# Patient Record
Sex: MALE | Race: WHITE | NOT HISPANIC OR LATINO | URBAN - METROPOLITAN AREA
[De-identification: names, ages, dates, MRNs, and addresses within clinical notes are randomized per-mention and may not be internally consistent; named-entity substitution may affect disease eponyms.]

---

## 2017-05-02 ENCOUNTER — EMERGENCY (EMERGENCY)
Facility: HOSPITAL | Age: 32
LOS: 1 days | Discharge: HOME | End: 2017-05-02
Attending: EMERGENCY MEDICINE | Admitting: EMERGENCY MEDICINE
Payer: SELF-PAY

## 2017-05-02 VITALS
SYSTOLIC BLOOD PRESSURE: 119 MMHG | OXYGEN SATURATION: 97 % | WEIGHT: 184.97 LBS | DIASTOLIC BLOOD PRESSURE: 62 MMHG | HEART RATE: 106 BPM | TEMPERATURE: 98 F | RESPIRATION RATE: 18 BRPM

## 2017-05-02 VITALS
OXYGEN SATURATION: 97 % | SYSTOLIC BLOOD PRESSURE: 116 MMHG | TEMPERATURE: 98 F | RESPIRATION RATE: 18 BRPM | HEART RATE: 104 BPM | DIASTOLIC BLOOD PRESSURE: 68 MMHG

## 2017-05-02 DIAGNOSIS — T23.241A BURN OF SECOND DEGREE OF MULTIPLE RIGHT FINGERS (NAIL), INCLUDING THUMB, INITIAL ENCOUNTER: ICD-10-CM
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
## 2017-05-02 DIAGNOSIS — Z72.0 TOBACCO USE: ICD-10-CM
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
## 2017-05-02 PROCEDURE — 99283 EMERGENCY DEPT VISIT LOW MDM: CPT | Mod: 25

## 2017-05-02 PROCEDURE — 16020 DRESS/DEBRID P-THICK BURN S: CPT
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
## 2017-05-02 NOTE — ED PROVIDER NOTE - MUSCULOSKELETAL, MLM
Spine appears normal, range of motion is not limited, no muscle or joint tenderness. Right hand  1-5 finger at finger  pads + blisters noted. blister isolated at finger pads only not circumferential, no motor or sensory deficit,
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
## 2021-07-13 NOTE — ED ADULT NURSE NOTE - NS ED NOTE ABUSE RESPONSE YN
Care Management Discharge Note    Discharge Date: 07/13/2021  Expected Time of Departure:  ([facility] transport stretcher at 1500hrs.)    Discharge Disposition: Skilled Nursing Facilty ([facility] at [phone].)    Discharge Services:      Discharge DME: None    Discharge Transportation: agency    PAS Confirmation Code:  ([id number])  Patient/family educated on Medicare website which has current facility and service quality ratings: yes (patient and friend Moi)    Education Provided on the Discharge Plan:    Persons Notified of Discharge Plans: Pt, friend, and bedside RN.  Patient/Family in Agreement with the Plan: yes (patient and friend Moi)      Additional Information:  After further discussion with [facility], they have approved a TCU stay authorization.    Pt will be transferring to [facility] at ~1500hrs per [facility] stretcher transport for safety d/t cognitive decline and physical mobility.    All parties involved are in agreement with the transfer.    PAS and PCS completed by CM.    No further CM needs identified.    Lv Puente, RN         no

## 2022-10-12 ENCOUNTER — EMERGENCY (EMERGENCY)
Facility: HOSPITAL | Age: 37
LOS: 1 days | Discharge: HOME | End: 2022-10-12
Admitting: EMERGENCY MEDICINE
Payer: SELF-PAY

## 2022-10-12 VITALS
HEART RATE: 82 BPM | TEMPERATURE: 98 F | DIASTOLIC BLOOD PRESSURE: 78 MMHG | SYSTOLIC BLOOD PRESSURE: 122 MMHG | RESPIRATION RATE: 18 BRPM | OXYGEN SATURATION: 99 %

## 2022-10-12 VITALS
WEIGHT: 179.9 LBS | RESPIRATION RATE: 18 BRPM | SYSTOLIC BLOOD PRESSURE: 118 MMHG | OXYGEN SATURATION: 97 % | TEMPERATURE: 98 F | DIASTOLIC BLOOD PRESSURE: 78 MMHG | HEART RATE: 80 BPM | HEIGHT: 68 IN

## 2022-10-12 DIAGNOSIS — H18.892 OTHER SPECIFIED DISORDERS OF CORNEA, LEFT EYE: ICD-10-CM
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
## 2022-10-12 DIAGNOSIS — T15.92XA FOREIGN BODY ON EXTERNAL EYE, PART UNSPECIFIED, LEFT EYE, INITIAL ENCOUNTER: ICD-10-CM
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
## 2022-10-12 DIAGNOSIS — H57.89 OTHER SPECIFIED DISORDERS OF EYE AND ADNEXA: ICD-10-CM
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
## 2022-10-12 PROCEDURE — 99284 EMERGENCY DEPT VISIT MOD MDM: CPT

## 2022-10-12 PROCEDURE — 65222 REMOVE FOREIGN BODY FROM EYE: CPT | Mod: LT

## 2022-10-12 RX ORDER — ERYTHROMYCIN BASE 5 MG/GRAM
1 OINTMENT (GRAM) OPHTHALMIC (EYE)
Qty: 1 | Refills: 0
Start: 2022-10-12 | End: 2022-10-18

## 2022-10-12 RX ORDER — OFLOXACIN 0.3 %
1 DROPS OPHTHALMIC (EYE)
Qty: 10 | Refills: 0
Start: 2022-10-12 | End: 2022-10-18

## 2022-10-12 RX ORDER — POLYMYXIN B SULF/TRIMETHOPRIM 10000-1/ML
1 DROPS OPHTHALMIC (EYE) ONCE
Refills: 0 | Status: COMPLETED | OUTPATIENT
Start: 2022-10-12 | End: 2022-10-12

## 2022-10-12 RX ADMIN — Medication 1 DROP(S): at 20:37

## 2022-10-12 NOTE — ED PROVIDER NOTE - PATIENT PORTAL LINK FT
You can access the FollowMyHealth Patient Portal offered by [facility] by registering at the following website: [email or web address]. By joining [facility]’s FollowMyHealth portal, you will also be able to view your health information using other applications (apps) compatible with our system.

## 2022-10-12 NOTE — ED ADULT NURSE NOTE - OBJECTIVE STATEMENT
Pt presented to ED with c/o of L eye pain/ blurriness since yesterday due to cutting metal w/ a  at work and possibility it have got a piece of it into L eye. AOX4. VSS.  Patient denies any acute symptoms. Patient oriented to ED area. All needs attended. Rounding in progress. Fall risk precautions maintained.

## 2022-10-12 NOTE — ED ADULT TRIAGE NOTE - CHIEF COMPLAINT QUOTE
pt c/o L eye pain/ blurriness since yesterday. pt states he was cutting metal w/ a  at work and may have got a piece of it into L eye. pt states was wearing goggles at the time. used eye drops at home w no relief

## 2022-10-12 NOTE — ED PROVIDER NOTE - EYES, MLM
left eye +sclera injection, PERRLA, EOMI, visual acuity 20/20, fluorescein stain with +fb on cornea at 10 oclock

## 2022-10-12 NOTE — ED PROVIDER NOTE - OBJECTIVE STATEMENT
38 y/o m presents c/o left eye redness, tearing, discomfort since yesterday afternoon.  Pt was drilling metal, was wearing eye protection at the time but thinks something got in his eye.  Pt reports no vision changes, mostly tearing and discomfort.  Denies contact lenses.

## 2022-10-12 NOTE — ED PROVIDER NOTE - PROGRESS NOTE DETAILS
Received s/o from [facility] pending ophthalmology eval for retained metal FB. Pt seen and examined by ophthalmology. FB removed. Rust ring present. Pt to f/u w/ ophthalmology in 24- 48 hours for further tx of rust ring. Recommends: Ofloxacin gttps QID followed by erythryomycin ointment QID x 1 week

## 2022-10-12 NOTE — CONSULT NOTE ADULT - SUBJECTIVE AND OBJECTIVE BOX
HPI: 37M presenting with left eye metallic foreign body. Reports that 1 day ago, patient was drilling metal, was wearing safety goggles when small piece of metal went into his left eye. Denies changes in vision. No flashes or floaters. Reports discomfort and some tearing.    Ophthalmic Exam (limited by bedside evaluation)     Vision (near):     OD 20/20    OS 20/20    Intraocular Pressure (measured by palpation):     OD wnl    OS wnl    Confrontational Visual Fields:     OD full to finger     OS full to finger     Pupils:     OD round, reactive, no APD     OS round, reactive, no APD     Motility:     OD grossly full     OS grossly full     External/Lids/Orbit:     OD normal    OS normal    Conjunctiva:     OD white and quiet     OS slight injection    Cornea:     OD clear     OS small approximately 1x1mm circular metallic foreign body embedded within cornea at peripheral 10oc    Anterior Chamber:     OD deep and quiet     OS deep and quiet, no notable cell    Iris:     OD normal     OS normal     Lens:     OD clear     OS clear     Deferred Dilated Fundus Exam

## 2022-10-12 NOTE — ED PROVIDER NOTE - CARE PLAN
Principal Discharge DX:	Foreign body in eye   1 Principal Discharge DX:	Foreign body in eye  Secondary Diagnosis:	Corneal rust ring, left

## 2022-10-12 NOTE — ED PROVIDER NOTE - NSFOLLOWUPCLINICS_GEN_ALL_ED_FT
[facility]  Ophthalmology  [address] [place] [address]  Phone: [phone]  Fax:

## 2022-10-12 NOTE — ED PROVIDER NOTE - CLINICAL SUMMARY MEDICAL DECISION MAKING FREE TEXT BOX
38 y/o m presents with left eye fb; visual acuity intact, pt refusing tetanus shot (educated regarding possibility of tetanus infection causing respiratory failure)

## 2022-10-12 NOTE — ED ADULT NURSE NOTE - NSFALLRSKUNASSIST_ED_ALL_ED
TSH normal. She was contributing due to recent antibiotic use for tooth extraction. We'll reevaluate   no

## 2022-10-12 NOTE — ED PROVIDER NOTE - NSFOLLOWUPINSTRUCTIONS_ED_ALL_ED_FT
You were evaluated in the ED for left eye pain and metal foreign body. You were evaluated in the ED by ophthalmology and the foreign body was removed. There is a residual rust ring. Sometimes the rust ring needs further debridement. For this reason, IT IS IMPORTANT YOU FOLLOW UP WITH OPHTHALMOLOGY WITHIN 24-48 HOURS FOR FURTHER EVALUATION AND TREATMENT.     TAKE THE FOLLOWING ANTIBIOTICS AS PRESCRIBED:  -Ofloxacin eye drops 4 times a day in the eye  -Erythromycin ointment, to be placed in the left eye AFTER the ofloxacin eye drop, 4 times a day    [facility]  [address] [place] [address]   [phone]    or [facility] (see follow up section)      Eye Foreign Body      An eye foreign body is an object on the surface of the eye or in the eyeball that should not be there. The foreign body may be a small speck of dirt or dust, a hair or eyelash, a splinter, a tiny piece of metal, or any other object.    A foreign body can injure the eye. It may be found on the outside of the eyeball (extraocular) or inside the eyeball (intraocular). An intraocular foreign body is a medical emergency because it can result in vision loss or loss of the eye.      What are the causes?    This condition is caused by an object accidentally hitting or entering the eye. A common cause is when a tiny piece of metal is thrown into the air while a person is working with certain tools.      What are the signs or symptoms?    Symptoms of this condition depend on what the foreign body is, and where it is in the eye. In some cases, a small foreign body may cause few symptoms at first. Foreign bodies are commonly found:•On the inner surface of the eyelids or on the covering of the white part of the eye (conjunctiva). A foreign body here may cause:  •Pain and irritation, especially when blinking.      •Feeling like something is in the eye.      •Tearing.      •Redness.      •On the surface of the clear covering on the front of the eye (cornea). A foreign body here may cause:  •Pain and irritation.      •Small "rust rings" around a metal (metallic) foreign body.      •Feeling like something is in the eye.      •Blurry vision.      •Tearing.      •Redness.      •Inside the eyeball. A foreign body here may cause:  •A lot of pain.      •Immediate loss of vision or blurry vision.      •A change in the shape of the black part of the eye (distortion of the pupil).          How is this diagnosed?    Foreign bodies are found during an exam by an eye care specialist.  •Foreign bodies on the eyelids, conjunctiva, or cornea are usually (but not always) easily found.    •When a foreign body is inside the eyeball, cloudiness in the lens of the eye (cataract) may form almost right away. This makes it hard for an eye specialist to find the foreign body. You may need tests, such as:  •Ultrasound.      •X-rays.      •CT scan.          How is this treated?    Foreign bodies on the eyelids, conjunctiva, or cornea are often removed easily and painlessly. Your health care provider may do this by washing the eye or using small instruments to take the foreign body out. Treatment may also include:  •Using numbing (anesthetic) eye drops to relieve pain.      •Removal of rust in the cornea using a small, drill-like instrument.      •Antibiotic drops or ointment if the foreign body caused a scratch or scrape (abrasion) of the cornea.      •Wearing a bandage (eye shield) over your eye.      If you have a foreign body inside your eyeball, you will need surgery right away.    You may need to be referred to an eye specialist (ophthalmologist) for further treatment.      Follow these instructions at home:     Medicines     •Take over-the-counter and prescription medicines only as told by your health care provider. Use eye drops or ointment as directed.      •If you were prescribed antibiotic drops or ointment, use it as told by your health care provider. Do not stop using the antibiotic even if your condition improves.      General instructions   •If you have an eye shield:  •Wear it as directed. Follow instructions from your health care provider about when to remove the shield.      •Do not drive or use machinery while wearing the shield.      •If you do not have an eye shield:  •Keep your eye closed as much as possible.      •Do not rub your eye.      •Do not wear contact lenses until your eye feels normal again, or as instructed by your health care provider.      •Wear dark sunglasses as needed to protect your eyes from bright light.      •If you are doing activities with a high risk of eye injury, such as working with high-speed tools, wear protective eye covering.        •Keep all follow-up visits. This is important.        Contact a health care provider if:    •You have more pain in your eye.      •You have problems with your eye shield.      •You have abnormal fluid (discharge) coming from your eye.        Get help right away if:    •Your vision gets worse.      •You have more redness and swelling in or around your eye.        Summary    •An eye foreign body is an object on the surface of the eye or in the eyeball that should not be there.      •A foreign body can injure the eye. It may be found on the outside of the eyeball (extraocular) or inside the eyeball (intraocular). An intraocular foreign body is a medical emergency.      •This condition is caused by objects that accidentally contact or enter the eye. Examples of these objects include dirt, dust, glass, metal, or an eyelash.      •Treatment may involve removal of the foreign body by washing the eye, using certain instruments, or surgery. You may need to use antibiotics or wear a bandage (eye shield) over your eye.      This information is not intended to replace advice given to you by your health care provider. Make sure you discuss any questions you have with your health care provider.

## 2022-10-12 NOTE — CONSULT NOTE ADULT - ASSESSMENT
Metallic Foreign Body, Left Eye  - presented with small approx. 1x1mm circular metallic foreign body to left peripheral cornea x 1 day  - vision stable  - no inflammation  - at slit-lamp, removed superficial metallic object with 27 gauge needle  - rust ring still present   PLAN:  - start ofloxacin QID to left eye  - start erythromycin ophthalmic ointment QID to left eye (drops before ointment)  - will need follow-up in 1-2 days to remove rust ring with nidia tool  - dependent on insurance status, instructed pt to call for appt at [facility] or [facility]

## 2022-10-14 ENCOUNTER — APPOINTMENT (OUTPATIENT)
Dept: OPHTHALMOLOGY | Facility: CLINIC | Age: 37
End: 2022-10-14

## 2022-10-14 ENCOUNTER — NON-APPOINTMENT (OUTPATIENT)
Age: 37
End: 2022-10-14

## 2022-10-14 PROCEDURE — 65210 REMOVE FOREIGN BODY FROM EYE: CPT | Mod: LT

## 2022-10-14 PROCEDURE — 92002 INTRM OPH EXAM NEW PATIENT: CPT | Mod: 25

## 2022-10-21 ENCOUNTER — APPOINTMENT (OUTPATIENT)
Dept: OPHTHALMOLOGY | Facility: CLINIC | Age: 37
End: 2022-10-21

## 2023-03-07 ENCOUNTER — APPOINTMENT (OUTPATIENT)
Dept: OPHTHALMOLOGY | Facility: CLINIC | Age: 38
End: 2023-03-07
Payer: COMMERCIAL

## 2023-03-07 ENCOUNTER — APPOINTMENT (OUTPATIENT)
Dept: OPHTHALMOLOGY | Facility: CLINIC | Age: 38
End: 2023-03-07

## 2023-03-07 ENCOUNTER — NON-APPOINTMENT (OUTPATIENT)
Age: 38
End: 2023-03-07

## 2023-03-07 PROCEDURE — 92012 INTRM OPH EXAM EST PATIENT: CPT

## 2023-03-10 ENCOUNTER — APPOINTMENT (OUTPATIENT)
Dept: OPHTHALMOLOGY | Facility: CLINIC | Age: 38
End: 2023-03-10

## 2023-06-28 PROBLEM — Z00.00 ENCOUNTER FOR PREVENTIVE HEALTH EXAMINATION: Status: ACTIVE | Noted: 2023-06-28

## 2023-08-23 ENCOUNTER — APPOINTMENT (OUTPATIENT)
Dept: OPHTHALMOLOGY | Facility: CLINIC | Age: 38
End: 2023-08-23
Payer: COMMERCIAL

## 2023-08-23 ENCOUNTER — NON-APPOINTMENT (OUTPATIENT)
Age: 38
End: 2023-08-23

## 2023-08-23 PROCEDURE — 92002 INTRM OPH EXAM NEW PATIENT: CPT

## 2023-09-06 ENCOUNTER — NON-APPOINTMENT (OUTPATIENT)
Age: 38
End: 2023-09-06

## 2023-09-06 ENCOUNTER — APPOINTMENT (OUTPATIENT)
Dept: OPHTHALMOLOGY | Facility: CLINIC | Age: 38
End: 2023-09-06
Payer: COMMERCIAL

## 2023-09-06 PROCEDURE — 92012 INTRM OPH EXAM EST PATIENT: CPT

## 2024-02-07 NOTE — ED ADULT TRIAGE NOTE - NSTRIAGECARE_GEN_A_ER
Past Medical History:   Diagnosis Date    Anticoagulant long-term use     COVID-19 virus infection     Diabetes mellitus     Diabetes mellitus, type 2     Hypertension     May-Thurner syndrome      Past Surgical History:   Procedure Laterality Date    APPENDECTOMY      ARTHROSCOPY OF KNEE Left 1/16/2024    Procedure: ARTHROSCOPY, KNEE, LEFT;  Surgeon: Mandeep Gatica MD;  Location: [address];  Service: Orthopedics;  Laterality: Left;    ARTHROSCOPY OF KNEE Left 1/21/2024    Procedure: ARTHROSCOPY, KNEE, left, supine, slider, lateral post, conmed,;  Surgeon: Derick Sloan MD;  Location: [facility] OR [address] [place] [address];  Service: Orthopedics;  Laterality: Left;    ARTHROSCOPY OF KNEE Left 2/1/2024    Procedure: ARTHROSCOPY, KNEE, left, lateral post;  Surgeon: Mandeep Gatica MD;  Location: [address];  Service: Orthopedics;  Laterality: Left;    ARTHROTOMY OF KNEE Left 12/29/2023    Procedure: ARTHROTOMY, KNEE;  Surgeon: Edy Bocanegra Jr., MD;  Location: [facility];  Service: Orthopedics;  Laterality: Left;    ESOPHAGOGASTRODUODENOSCOPY N/A 1/31/2022    Procedure: EGD (ESOPHAGOGASTRODUODENOSCOPY);  Surgeon: Cindy Quiros MD;  Location: [facility];  Service: Endoscopy;  Laterality: N/A;    ESOPHAGOGASTRODUODENOSCOPY N/A 3/21/2022    Procedure: EGD (ESOPHAGOGASTRODUODENOSCOPY);  Surgeon: Tejas Mann MD;  Location: [facility];  Service: Endoscopy;  Laterality: N/A;    INCISION AND DRAINAGE FOOT Left 11/28/2021    Procedure: INCISION AND DRAINAGE, FOOT;  Surgeon: Bj lAicea DPM;  Location: [facility];  Service: Podiatry;  Laterality: Left;    INCISION AND DRAINAGE OF THIGH Right 1/21/2024    Procedure: INCISION AND DRAINAGE, THIGH - right,;  Surgeon: Derick Sloan MD;  Location: [address];  Service: Orthopedics;  Laterality: Right;  right, lateral, slider, cysto tubing, 6L NS, betadine, peroxide, vanc powder, 10 [place] channel winter drain    IRRIGATION AND DEBRIDEMENT OF LOWER  EXTREMITY Right 1/21/2024    Procedure: I&D lateral thigh abscess, right, supine, slider, cysto tubing, 6L NS, betadine, peroxide, vanc powder, 10 [place]ian channel winter drain,;  Surgeon: Derick Sloan MD;  [facility];  Service: Orthopedics;  Laterality: Right;    IRRIGATION AND DEBRIDEMENT OF LOWER EXTREMITY Right 2/1/2024    Procedure: IRRIGATION AND DEBRIDEMENT, LOWER EXTREMITY;  Surgeon: Mandeep Gatica MD;  [facility];  Service: Orthopedics;  Laterality: Right;  started at 1510 pm incision    KNEE ARTHROSCOPY W/ DEBRIDEMENT Left 1/21/2024    Procedure: ARTHROSCOPY, KNEE, WITH DEBRIDEMENT - LEFT, SUPINE, SLIDER, LATERAL POST, CONMED;  Surgeon: Derick Sloan MD;  [facility];  Service: Orthopedics;  Laterality: Left;    stents in bilateral legs      TRANSESOPHAGEAL ECHOCARDIOGRAPHY N/A 1/3/2024    Procedure: ECHOCARDIOGRAM, TRANSESOPHAGEAL;  Surgeon: Douglas Doss MD;  Location: [facility] CATH LAB/EP;  Service: Cardiology;  Laterality: N/A;     Review of patient's allergies indicates:   Allergen Reactions    Heparin analogues Nausea And Vomiting       Scheduled Medications:    ampicillin-sulbactam  3 g Intravenous Q6H    DAPTOmycin (CUBICIN) 945 mg in sodium chloride 0.9% SolP 50 mL IVPB  10 mg/kg (Adjusted) Intravenous Daily    insulin aspart U-100  7 Units Subcutaneous TIDWM    insulin detemir U-100  13 Units Subcutaneous QHS    metoprolol succinate  12.5 mg Oral Daily    rivaroxaban  10 mg Oral Daily with dinner    vitamin D  1,000 Units Oral Daily       PRN Medications: acetaminophen, dextrose 10%, dextrose 10%, glucagon (human recombinant), glucose, glucose, HYDROcodone-acetaminophen, insulin aspart U-100, methocarbamoL, naloxone, oxyCODONE, prochlorperazine, sodium chloride 0.9%, sodium chloride 0.9%    Family History       Problem Relation (Age of Onset)    Cancer Mother, Paternal Uncle, Paternal Grandfather, Maternal Grandfather     Irritable bowel syndrome Paternal Grandfather          Tobacco Use    Smoking status: Former     Types: Cigarettes    Smokeless tobacco: Former    Tobacco comments:     occasionally    Substance and Sexual Activity    Alcohol use: Yes     Comment: occ    Drug use: No    Sexual activity: Not on file     Review of Systems   Constitutional:  Positive for activity change. Negative for fatigue and fever.   HENT:  Negative for trouble swallowing and voice change.    Respiratory:  Negative for cough and shortness of breath.    Cardiovascular:  Negative for chest pain and leg swelling.   Gastrointestinal:  Negative for abdominal distention and abdominal pain.   Genitourinary:  Negative for difficulty urinating and flank pain.   Musculoskeletal:  Positive for gait problem. Negative for back pain.   Skin:  Negative for color change.   Neurological:  Positive for weakness. Negative for speech difficulty and numbness.   Psychiatric/Behavioral:  Negative for agitation and confusion.      Objective:     Vital Signs (Most Recent):  Temp: 98.5 °F (36.9 °C) (02/07/24 0719)  Pulse: 85 (02/07/24 0719)  Resp: 17 (02/07/24 0719)  BP: (!) 141/89 (02/07/24 0719)  SpO2: 99 % (02/07/24 0719)    Vital Signs (24h Range):  Temp:  [97.8 °F (36.6 °C)-98.6 °F (37 °C)] 98.5 °F (36.9 °C)  Pulse:  [80-92] 85  Resp:  [17-18] 17  SpO2:  [95 %-100 %] 99 %  BP: (127-164)/(68-95) 141/89     Body mass index is 31.03 kg/m².     Physical Exam  Vitals and nursing note reviewed.   Constitutional:       Appearance: He is well-developed.   HENT:      Head: Normocephalic and atraumatic.   Eyes:      General:         Right eye: No discharge.         Left eye: No discharge.      Pupils: Pupils are equal, round, and reactive to light.   Pulmonary:      Effort: Pulmonary effort is normal. No respiratory distress.   Abdominal:      General: There is no distension.      Palpations: Abdomen is soft.      Tenderness: There is no abdominal tenderness.   Musculoskeletal:          General: No deformity.      Cervical back: Neck supple.      Comments: RLE with drain in place to thigh  LLE with dressing/ace badndage in place    Skin:     General: Skin is warm and dry.   Neurological:      Mental Status: He is oriented to person, place, and time. Mental status is at baseline.      Sensory: No sensory deficit.      Motor: Weakness present. No abnormal muscle tone.      Gait: Gait abnormal.   Psychiatric:         Mood and Affect: Mood normal.         Behavior: Behavior normal.         Thought Content: Thought content normal.          NEUROLOGICAL EXAMINATION:     MENTAL STATUS   Oriented to person, place, and time.     CRANIAL NERVES     CN III, IV, VI   Pupils are equal, round, and reactive to light.      Diagnostic Results: Labs: Reviewed  ECG: Reviewed  CT: Reviewed   Ice

## 2024-03-01 ENCOUNTER — EMERGENCY (EMERGENCY)
Facility: HOSPITAL | Age: 39
LOS: 1 days | Discharge: HOME | End: 2024-03-01
Admitting: EMERGENCY MEDICINE
Payer: COMMERCIAL

## 2024-03-01 VITALS
WEIGHT: 164.91 LBS | OXYGEN SATURATION: 99 % | SYSTOLIC BLOOD PRESSURE: 120 MMHG | TEMPERATURE: 98 F | HEART RATE: 62 BPM | DIASTOLIC BLOOD PRESSURE: 83 MMHG | RESPIRATION RATE: 16 BRPM

## 2024-03-01 DIAGNOSIS — M25.511 PAIN IN RIGHT SHOULDER: ICD-10-CM

## 2024-03-01 LAB
ANION GAP SERPL CALC-SCNC: 11 MMOL/L — SIGNIFICANT CHANGE UP (ref 5–17)
BASOPHILS # BLD AUTO: 0.02 K/UL — SIGNIFICANT CHANGE UP (ref 0–0.2)
BASOPHILS NFR BLD AUTO: 0.3 % — SIGNIFICANT CHANGE UP (ref 0–2)
BUN SERPL-MCNC: 20 MG/DL — SIGNIFICANT CHANGE UP (ref 7–23)
CALCIUM SERPL-MCNC: 9.8 MG/DL — SIGNIFICANT CHANGE UP (ref 8.4–10.5)
CHLORIDE SERPL-SCNC: 104 MMOL/L — SIGNIFICANT CHANGE UP (ref 96–108)
CK MB CFR SERPL CALC: 2.5 NG/ML — SIGNIFICANT CHANGE UP (ref 0–6.7)
CK SERPL-CCNC: 185 U/L — SIGNIFICANT CHANGE UP (ref 30–200)
CO2 SERPL-SCNC: 26 MMOL/L — SIGNIFICANT CHANGE UP (ref 22–31)
CREAT SERPL-MCNC: 0.97 MG/DL — SIGNIFICANT CHANGE UP (ref 0.5–1.3)
EGFR: 102 ML/MIN/1.73M2 — SIGNIFICANT CHANGE UP
EOSINOPHIL # BLD AUTO: 0.12 K/UL — SIGNIFICANT CHANGE UP (ref 0–0.5)
EOSINOPHIL NFR BLD AUTO: 1.9 % — SIGNIFICANT CHANGE UP (ref 0–6)
GLUCOSE SERPL-MCNC: 100 MG/DL — HIGH (ref 70–99)
HCT VFR BLD CALC: 40.5 % — SIGNIFICANT CHANGE UP (ref 39–50)
HGB BLD-MCNC: 14.3 G/DL — SIGNIFICANT CHANGE UP (ref 13–17)
IMM GRANULOCYTES NFR BLD AUTO: 0.3 % — SIGNIFICANT CHANGE UP (ref 0–0.9)
LYMPHOCYTES # BLD AUTO: 1.69 K/UL — SIGNIFICANT CHANGE UP (ref 1–3.3)
LYMPHOCYTES # BLD AUTO: 27.3 % — SIGNIFICANT CHANGE UP (ref 13–44)
MAGNESIUM SERPL-MCNC: 1.9 MG/DL — SIGNIFICANT CHANGE UP (ref 1.6–2.6)
MCHC RBC-ENTMCNC: 31.2 PG — SIGNIFICANT CHANGE UP (ref 27–34)
MCHC RBC-ENTMCNC: 35.3 GM/DL — SIGNIFICANT CHANGE UP (ref 32–36)
MCV RBC AUTO: 88.2 FL — SIGNIFICANT CHANGE UP (ref 80–100)
MONOCYTES # BLD AUTO: 0.31 K/UL — SIGNIFICANT CHANGE UP (ref 0–0.9)
MONOCYTES NFR BLD AUTO: 5 % — SIGNIFICANT CHANGE UP (ref 2–14)
NEUTROPHILS # BLD AUTO: 4.04 K/UL — SIGNIFICANT CHANGE UP (ref 1.8–7.4)
NEUTROPHILS NFR BLD AUTO: 65.2 % — SIGNIFICANT CHANGE UP (ref 43–77)
NRBC # BLD: 0 /100 WBCS — SIGNIFICANT CHANGE UP (ref 0–0)
PLATELET # BLD AUTO: 191 K/UL — SIGNIFICANT CHANGE UP (ref 150–400)
POTASSIUM SERPL-MCNC: 4.5 MMOL/L — SIGNIFICANT CHANGE UP (ref 3.5–5.3)
POTASSIUM SERPL-SCNC: 4.5 MMOL/L — SIGNIFICANT CHANGE UP (ref 3.5–5.3)
RBC # BLD: 4.59 M/UL — SIGNIFICANT CHANGE UP (ref 4.2–5.8)
RBC # FLD: 11.9 % — SIGNIFICANT CHANGE UP (ref 10.3–14.5)
SODIUM SERPL-SCNC: 141 MMOL/L — SIGNIFICANT CHANGE UP (ref 135–145)
TROPONIN T, HIGH SENSITIVITY RESULT: <6 NG/L — SIGNIFICANT CHANGE UP (ref 0–51)
WBC # BLD: 6.2 K/UL — SIGNIFICANT CHANGE UP (ref 3.8–10.5)
WBC # FLD AUTO: 6.2 K/UL — SIGNIFICANT CHANGE UP (ref 3.8–10.5)

## 2024-03-01 PROCEDURE — 71046 X-RAY EXAM CHEST 2 VIEWS: CPT

## 2024-03-01 PROCEDURE — 36415 COLL VENOUS BLD VENIPUNCTURE: CPT

## 2024-03-01 PROCEDURE — 93005 ELECTROCARDIOGRAM TRACING: CPT

## 2024-03-01 PROCEDURE — 84484 ASSAY OF TROPONIN QUANT: CPT

## 2024-03-01 PROCEDURE — 85025 COMPLETE CBC W/AUTO DIFF WBC: CPT

## 2024-03-01 PROCEDURE — 99285 EMERGENCY DEPT VISIT HI MDM: CPT | Mod: 25

## 2024-03-01 PROCEDURE — 83735 ASSAY OF MAGNESIUM: CPT

## 2024-03-01 PROCEDURE — 80048 BASIC METABOLIC PNL TOTAL CA: CPT

## 2024-03-01 PROCEDURE — 82550 ASSAY OF CK (CPK): CPT

## 2024-03-01 PROCEDURE — 73030 X-RAY EXAM OF SHOULDER: CPT

## 2024-03-01 PROCEDURE — 82553 CREATINE MB FRACTION: CPT

## 2024-03-01 PROCEDURE — 71046 X-RAY EXAM CHEST 2 VIEWS: CPT | Mod: 26

## 2024-03-01 PROCEDURE — 73030 X-RAY EXAM OF SHOULDER: CPT | Mod: 26,RT

## 2024-03-01 PROCEDURE — 99285 EMERGENCY DEPT VISIT HI MDM: CPT

## 2024-03-01 RX ORDER — IBUPROFEN 200 MG
600 TABLET ORAL ONCE
Refills: 0 | Status: COMPLETED | OUTPATIENT
Start: 2024-03-01 | End: 2024-03-01

## 2024-03-01 RX ADMIN — Medication 600 MILLIGRAM(S): at 16:27

## 2024-03-01 NOTE — ED ADULT NURSE NOTE - OBJECTIVE STATEMENT
37 y/o M c/o R shoulder pain for approx 2 months, endorses pain radiates down R arm and pulsating at times. Pt denies trauma, falls, numbness, tingling, neck pain, dizziness, lightheadedness, blurry vision, chest pain, SOB. PT A&Ox4, respirations even and unlabored, skin color WDL warm and dry, pt is ambulatory with a steady gait. No acute distress observed.

## 2024-03-01 NOTE — ED PROVIDER NOTE - CARE PROVIDER_API CALL
Victor Hugo Vu  Orthopaedic Surgery  [address], [address]  [place] [address]  Phone: [phone]  Fax: [phone]  Follow Up Time:     Hector Valentin  Orthopaedic Surgery  [address]  [place] [address]  Phone: [phone]  Fax: [phone]  Follow Up Time:     Truong Lebron  Internal Medicine  [address] [place] [address]  Phone: [phone]  Fax: [phone]  Follow Up Time:

## 2024-03-01 NOTE — ED PROVIDER NOTE - PROVIDER TOKENS
PROVIDER:[TOKEN:[[id number]]],PROVIDER:[TOKEN:[[id number]]],PROVIDER:[TOKEN:[[id number]]]

## 2024-03-01 NOTE — ED PROVIDER NOTE - CLINICAL SUMMARY MEDICAL DECISION MAKING FREE TEXT BOX
37 yo m with no pmh, RHD c/o R shoulder pain x 2 months. Pain goes down his arm and feel a pulsating in his shoulder and different spots of his arm. Denies trauma but works as  and sometimes lifts heavy things. Denies numbness, tingling, ha, neck pain, weakness, cp, sob. Pt has never taken anything for pain. States it has been getting worse specifically at night. R shoulder- non tender, FROM at shoulder and elbow, no erythema, warmth, swelling, sensation intact, strength 5/5. XR neg. advised NSAIDs, ortho f/u, will all refer to pcp 37 yo m with no pmh, RHD c/o R shoulder pain x 2 months. Pain goes down his arm and feel a pulsating in his shoulder and different spots of his arm. Also sometimes radiates into the chest. Denies trauma but works as  and sometimes lifts heavy things. Denies numbness, tingling, ha, neck pain, weakness, sob. Pt has never taken anything for pain. States it has been getting worse specifically at night. R shoulder- non tender, FROM at shoulder and elbow, no erythema, warmth, swelling, sensation intact, strength 5/5. XR neg. advised NSAIDs, ortho f/u, will all refer to pcp

## 2024-03-01 NOTE — ED PROVIDER NOTE - PATIENT PORTAL LINK FT
You can access the FollowMyHealth Patient Portal offered by [facility] by registering at the following website: [email or web address]. By joining [employer]’s FollowMyHealth portal, you will also be able to view your health information using other applications (apps) compatible with our system.

## 2024-03-01 NOTE — ED ADULT NURSE NOTE - CAS EDN DISCHARGE INTERVENTIONS
Called and left message. I will call again    Dermatology recommends mod strength steroid for 2 weeks with moisturizing.       
Called and reviewed    Orders Placed This Encounter     triamcinolone (KENALOG) 0.1 % ointment     Sig: Apply sparingly to affected area 2 times daily for 14 days. Continue with moisturizers.     Dispense:  80 g     Refill:  3     Follow up in 3-4 weeks for recheck  
IV discontinued, cath removed intact

## 2024-03-01 NOTE — ED PROVIDER NOTE - PHYSICAL EXAMINATION
CONSTITUTIONAL: Well-appearing;  in no apparent distress.   HEAD: Normocephalic; atraumatic.   EYES: PERRL; EOM intact; conjunctiva and sclera clear  ENT: normal nose; no rhinorrhea; normal pharynx with no erythema or lesions.   NECK: Supple; non-tender;   CARDIOVASCULAR: rrr,  RESPIRATORY: Breathing easily;   MSK: R shoulder- non tender, FROM at shoulder and elbow, no erythema, warmth, swelling, sensation intact, strength 5/5   EXT: No cyanosis or edema; N/V intact  SKIN: Normal for age and race; warm; dry; good turgor; no apparent lesions or rash.

## 2024-03-01 NOTE — ED PROVIDER NOTE - OBJECTIVE STATEMENT
37 yo m with no pmh, RHD c/o R shoulder pain x 2 months. Pain goes down his arm and feel a pulsating in his shoulder and different spots of his arm. Denies trauma but works as  and sometimes lifts heavy things. Denies numbness, tingling, ha, neck pain, weakness, cp, sob. Pt has never taken anything for pain. States it has been getting worse specifically at night. States if he lies on the R shoulder it is more painful. Does not have a pcp 39 yo m with no pmh, RHD c/o R shoulder pain x 2 months. Pain goes down his arm and feel a pulsating in his shoulder and different spots of his arm. States sometimes radiates into the chest.  Denies trauma but works as  and sometimes lifts heavy things. Denies numbness, tingling, ha, neck pain, weakness, sob. Pt has never taken anything for pain. States it has been getting worse specifically at night. States if he lies on the R shoulder it is more painful. Does not have a pcp

## 2024-03-01 NOTE — ED PROVIDER NOTE - NSFOLLOWUPINSTRUCTIONS_ED_ALL_ED_FT
Follow up with the orthopedic doctor and a primary care doctor  Take ibuprofen 600mg every 6 hours with food as needed for pain      Shoulder Pain  Many things can cause shoulder pain, including:  An injury to the shoulder.  Overuse of the shoulder.  Arthritis.  The source of the pain can be:  Inflammation.  An injury to the shoulder joint.  An injury to a tendon, ligament, or bone.  Follow these instructions at home:  Pay attention to changes in your symptoms. Let your health care provider know about them. Follow these instructions to relieve your pain.    If you have a removable sling:    Wear the sling as told by your provider. Remove it only as told by your provider.  Check the skin around the sling every day. Tell your provider about any concerns.  Loosen the sling if your fingers tingle, become numb, or become cold.  Keep the sling clean.  If the sling is not waterproof:  Do not let it get wet.  Remove it to shower or bathe.  Move your arm as little as possible, but keep your hand moving to prevent swelling.  Managing pain, stiffness, and swelling    Bag of ice on a towel on the skin.  If told, put ice on the painful area.  If you have a removable sling or immobilizer, remove it as told by your provider.  Put ice in a plastic bag.  Place a towel between your skin and the bag.  Leave the ice on for 20 minutes, 2–3 times a day.  If your skin turns bright red, remove the ice right away to prevent skin damage. The risk of damage is higher if you cannot feel pain, heat, or cold.  Move your fingers often to reduce stiffness and swelling.  Squeeze a soft ball or a foam pad as much as possible. This helps to keep the shoulder from swelling. It also helps to strengthen the arm.  General instructions    Take over-the-counter and prescription medicines only as told by your provider.  Exercise may help with pain management. Perform exercises if told by your provider.  You may be referred to a physical therapist to help in your recovery process.  Keep all follow-up visits in order to avoid any type of permanent shoulder disability or chronic pain problems.  Contact a health care provider if:  Your pain is not relieved with medicines.  New pain develops in your arm, hand, or fingers.  You loosen your sling and your arm, hand, or fingers remain tingly, numb, swollen, or painful.  Get help right away if:  Your arm, hand, or fingers turn white or blue.  This information is not intended to replace advice given to you by your health care provider. Make sure you discuss any questions you have with your health care provider. your xrays were normal   your labs were normal  Follow up with the orthopedic doctor and a primary care doctor  Take ibuprofen 600mg every 6 hours with food as needed for pain      Shoulder Pain  Many things can cause shoulder pain, including:  An injury to the shoulder.  Overuse of the shoulder.  Arthritis.  The source of the pain can be:  Inflammation.  An injury to the shoulder joint.  An injury to a tendon, ligament, or bone.  Follow these instructions at home:  Pay attention to changes in your symptoms. Let your health care provider know about them. Follow these instructions to relieve your pain.    If you have a removable sling:    Wear the sling as told by your provider. Remove it only as told by your provider.  Check the skin around the sling every day. Tell your provider about any concerns.  Loosen the sling if your fingers tingle, become numb, or become cold.  Keep the sling clean.  If the sling is not waterproof:  Do not let it get wet.  Remove it to shower or bathe.  Move your arm as little as possible, but keep your hand moving to prevent swelling.  Managing pain, stiffness, and swelling    Bag of ice on a towel on the skin.  If told, put ice on the painful area.  If you have a removable sling or immobilizer, remove it as told by your provider.  Put ice in a plastic bag.  Place a towel between your skin and the bag.  Leave the ice on for 20 minutes, 2–3 times a day.  If your skin turns bright red, remove the ice right away to prevent skin damage. The risk of damage is higher if you cannot feel pain, heat, or cold.  Move your fingers often to reduce stiffness and swelling.  Squeeze a soft ball or a foam pad as much as possible. This helps to keep the shoulder from swelling. It also helps to strengthen the arm.  General instructions    Take over-the-counter and prescription medicines only as told by your provider.  Exercise may help with pain management. Perform exercises if told by your provider.  You may be referred to a physical therapist to help in your recovery process.  Keep all follow-up visits in order to avoid any type of permanent shoulder disability or chronic pain problems.  Contact a health care provider if:  Your pain is not relieved with medicines.  New pain develops in your arm, hand, or fingers.  You loosen your sling and your arm, hand, or fingers remain tingly, numb, swollen, or painful.  Get help right away if:  Your arm, hand, or fingers turn white or blue.  This information is not intended to replace advice given to you by your health care provider. Make sure you discuss any questions you have with your health care provider.

## 2024-03-01 NOTE — ED PROVIDER NOTE - CARE PROVIDERS DIRECT ADDRESSES
,DirectAddress_Unknown,[email or web address]

## 2024-03-27 ENCOUNTER — NON-APPOINTMENT (OUTPATIENT)
Age: 39
End: 2024-03-27

## 2024-03-27 ENCOUNTER — APPOINTMENT (OUTPATIENT)
Dept: INTERNAL MEDICINE | Facility: CLINIC | Age: 39
End: 2024-03-27
Payer: COMMERCIAL

## 2024-03-27 ENCOUNTER — LABORATORY RESULT (OUTPATIENT)
Age: 39
End: 2024-03-27

## 2024-03-27 VITALS
WEIGHT: 190 LBS | SYSTOLIC BLOOD PRESSURE: 122 MMHG | HEART RATE: 90 BPM | BODY MASS INDEX: 28.14 KG/M2 | DIASTOLIC BLOOD PRESSURE: 85 MMHG | HEIGHT: 69 IN | TEMPERATURE: 98 F | OXYGEN SATURATION: 97 %

## 2024-03-27 DIAGNOSIS — R00.2 PALPITATIONS: ICD-10-CM

## 2024-03-27 DIAGNOSIS — M25.511 PAIN IN RIGHT SHOULDER: ICD-10-CM

## 2024-03-27 PROCEDURE — 36415 COLL VENOUS BLD VENIPUNCTURE: CPT

## 2024-03-27 PROCEDURE — 99385 PREV VISIT NEW AGE 18-39: CPT

## 2024-03-27 PROCEDURE — 93000 ELECTROCARDIOGRAM COMPLETE: CPT

## 2024-03-27 PROCEDURE — 99203 OFFICE O/P NEW LOW 30 MIN: CPT

## 2024-03-27 RX ORDER — LIDOCAINE 5% 700 MG/1
5 PATCH TOPICAL
Qty: 30 | Refills: 0 | Status: ACTIVE | COMMUNITY
Start: 2024-03-27 | End: 1900-01-01

## 2024-03-27 RX ORDER — NAPROXEN 500 MG/1
500 TABLET ORAL
Qty: 14 | Refills: 0 | Status: ACTIVE | COMMUNITY
Start: 2024-03-27 | End: 1900-01-01

## 2024-03-28 NOTE — ASSESSMENT
[FreeTextEntry1] : 38M w/no PMH presenting after ER visit on 3/1 to establish care, acute shoulder pain and palpitations.  Acute shoulder pain - Xray reviewed on HIE, Rx naproxen 500mg BID, although pt very hesitant to take oral medications. Discussed if no improvement, can trial gabapentin 100mg TID, possible nerve impingment?  Referred to ortho.  Palpitations - EKG sinus, labs including TSH today

## 2024-03-28 NOTE — HISTORY OF PRESENT ILLNESS
[FreeTextEntry1] : post ER visit, shoulder pain  [de-identified] : 38M w/no PMH presenting after ER visit on 3/1 for acute shoulder pain. Pt reports pain has been ongoing for weeks, with pins and needles sensation in his upper arm. Worse at night, needs to sleep on the other shoulder.  Does not like taking medications, so did not try anything for it.  In ER on 3/1, had R shoulder xray and CXR which was wnl, pt referred to PCP and ortho.

## 2024-03-28 NOTE — HEALTH RISK ASSESSMENT
[0] : 2) Feeling down, depressed, or hopeless: Not at all (0) [PHQ-2 Negative - No further assessment needed] : PHQ-2 Negative - No further assessment needed [Never] : Never [[id number]] : 0

## 2024-03-28 NOTE — PHYSICAL EXAM
[Well-Appearing] : well-appearing [EOMI] : extraocular movements intact [Supple] : supple [No Respiratory Distress] : no respiratory distress  [Normal Rate] : normal rate  [Regular Rhythm] : with a regular rhythm [Normal S1, S2] : normal S1 and S2 [Soft] : abdomen soft [Non Tender] : non-tender [No CVA Tenderness] : no CVA  tenderness [No Joint Swelling] : no joint swelling [Grossly Normal Strength/Tone] : grossly normal strength/tone [No Focal Deficits] : no focal deficits [Normal Gait] : normal gait [Normal Affect] : the affect was normal [Normal Insight/Judgement] : insight and judgment were intact [de-identified] : No obv abnormalitiy or tenderness of R shoulder joint, PROM and AROM intact, no limitations

## 2024-04-03 ENCOUNTER — APPOINTMENT (OUTPATIENT)
Dept: ORTHOPEDIC SURGERY | Facility: CLINIC | Age: 39
End: 2024-04-03
Payer: COMMERCIAL

## 2024-04-03 VITALS
OXYGEN SATURATION: 98 % | SYSTOLIC BLOOD PRESSURE: 122 MMHG | WEIGHT: 190 LBS | BODY MASS INDEX: 28.14 KG/M2 | DIASTOLIC BLOOD PRESSURE: 71 MMHG | HEIGHT: 69 IN | HEART RATE: 78 BPM

## 2024-04-03 PROCEDURE — 99203 OFFICE O/P NEW LOW 30 MIN: CPT

## 2024-04-03 RX ORDER — CYCLOBENZAPRINE HYDROCHLORIDE 10 MG/1
10 TABLET, FILM COATED ORAL
Qty: 30 | Refills: 0 | Status: ACTIVE | COMMUNITY
Start: 2024-04-03 | End: 1900-01-01

## 2024-04-03 RX ORDER — MELOXICAM 15 MG/1
15 TABLET ORAL DAILY
Qty: 30 | Refills: 1 | Status: ACTIVE | COMMUNITY
Start: 2024-04-03 | End: 1900-01-01

## 2024-04-05 NOTE — HISTORY OF PRESENT ILLNESS
[de-identified] : CIPRIANO ESTEVEZ is a 38 year old male who presents with right shoulder pain. Patient is right-hand dominant. Patient went to the ER on 3/1/2024 for this pain. No antecedent trauma or injury. Has not experienced previously. There is no associated stiffness, numbness, paraesthesia or weakness. No associated neck pain. Exacerbating factors are lifting, dressing, lifting arms over head, grasping Has tried Naproxen 500 mg but dislikes taking medication. Exercises regularly. Has not tried PT or OT. No prior injections or surgery. Employment:

## 2024-04-05 NOTE — PHYSICAL EXAM
[de-identified] : MSK: C-spine demonstrates normal lordosis, no tenderness to palpation midline, paraspinals, full painless AROM Cervical facet loading negative Spurling's Compression negative   Examination of right shoulder shows no overlying skin changes or muscular atrophy. There is no tenderness to palpation over the AC joint, Bicipital groove, Trapezius  ROM shows forward elevation 180 degrees abduction, 180 degrees external rotation, 60 degrees internal rotation to mid thoracic spine. There is no pain at the end range of motion.   Special Tests: Neer's negative Hawkin's negative Amos's negative Resisted ext rotation negative Lift off negative [place] negative Speed's negative Apprehension test negative No scapular winging.   Examination of left shoulder shows no overlying skin changes or muscular atrophy. There is no tenderness to palpation over the AC joint, Bicipital groove, Trapezius ROM shows forward elevation 180 degrees abduction, 180 degrees external rotation, 60 degrees internal rotation to mid thoracic spine. There is no pain at the end range of motion.   Special Tests: Neer's negative Hawkin's negative Amos's negative Resisted ext rotation negative Belly press negative [place] negative Speed's negative Apprehension test negative No scapular winging.   Sensation is intact to light touch over the axillary, musculocutaneous, median, radial, and ulnar nerve distributions bilaterally. Capillary refill is less than two seconds. Radial pulses 2+ equal bilaterally. Strength testing shows 5/5 abduction, 5/5 external rotation, 5/5 internal rotation, 5/5 biceps, 5/5 triceps. 5/5 wrist extension, 5/5 intrinsics. Reflexes 2+ biceps, brachioradialis.  [de-identified] : XR right shoulder (3/1/24): There is no evidence of fracture or dislocation. The joint spaces are preserved.

## 2024-04-05 NOTE — END OF VISIT
[FreeTextEntry3] :  All medical record entries made by the Scribe were at my, Dr.Leslie Pugh's, direction and personally dictated by me on 04/03/2024. I have reviewed the chart and agree that the record accurately reflects my personal performance of the history, physical exam, assessment and plan. I have also personally directed, reviewed, and agreed with the chart.

## 2024-04-05 NOTE — ASSESSMENT
[FreeTextEntry1] : CIPRIANO ESTEVEZ is a 38 year old male with left shoulder pain. I discussed with the patient that their symptoms, signs, and imaging are most consistent with scapular dyskinesis.  We reviewed the natural history of this condition and treatment options ranging from conservative measures (activity modification, physical therapy, icing, oral anti-inflammatory and/or analgesic medications, steroid injection) to surgical management.  We agreed on the following plan:  X-ray reviewed with patient today Activity modification Start Home Exercises for scapular stabilization. Demonstration, resistance band and handout provided.  Physical therapy. Referral provided. Medication: Meloxicam PRN and Cyclobenzaprine PRN.  Advanced imaging: consider MRI if no symptomatic improvement. Follow up in 6-8 weeks.

## 2024-05-22 ENCOUNTER — APPOINTMENT (OUTPATIENT)
Dept: ORTHOPEDIC SURGERY | Facility: CLINIC | Age: 39
End: 2024-05-22
Payer: COMMERCIAL

## 2024-05-22 VITALS
BODY MASS INDEX: 28.14 KG/M2 | OXYGEN SATURATION: 97 % | SYSTOLIC BLOOD PRESSURE: 115 MMHG | HEART RATE: 83 BPM | HEIGHT: 69 IN | DIASTOLIC BLOOD PRESSURE: 78 MMHG | WEIGHT: 190 LBS

## 2024-05-22 DIAGNOSIS — G25.89 OTHER SPECIFIED EXTRAPYRAMIDAL AND MOVEMENT DISORDERS: ICD-10-CM

## 2024-05-22 PROCEDURE — 99214 OFFICE O/P EST MOD 30 MIN: CPT

## 2024-05-27 NOTE — ASSESSMENT
[FreeTextEntry1] : CIPRIANO ESTEVEZ is a 39 year old male with right shoulder pain.    I discussed with the patient that their symptoms, signs, and imaging are most consistent with scapular dyskinesis. We reviewed the natural history of this condition and treatment options. We agreed on the following plan:  Encouraged to continue home exercises per handout. Start in-person physical therapy; patient has been given a referral. Medications: Meloxicam and cyclobenzaprine as needed. Imaging: MRI of the right shoulder to evaluate for rotator cuff or labral tear. Follow up after imaging.

## 2024-05-27 NOTE — PHYSICAL EXAM
[de-identified] : General: Well-nourished, well-developed, alert, and in no acute distress. Head: Normocephalic. Eyes: Pupils equal, extraocular muscles intact, normal sclera. Nose: No nasal discharge. Cardiovascular: Extremities are warm and well perfused. Distal pulses are symmetric bilaterally. Respiratory: No labored breathing. Extremities: Sensation is intact distally bilaterally. Distal pulses are symmetric bilaterally Lymphatic: No regional lymphadenopathy, no lymphedema Neurologic: No focal deficits Skin: Normal skin color, texture, and turgor Psychiatric: Normal affect   MSK: C-spine demonstrates normal lordosis, no tenderness to palpation midline, paraspinals, full painless AROM Cervical facet loading negative Spurling's Compression negative  Examination of right shoulder shows no overlying skin changes or muscular atrophy. There is no tenderness to palpation over the AC joint, Bicipital groove, Trapezius ROM shows forward elevation 180 degrees abduction, 180 degrees external rotation, 60 degrees internal rotation to mid thoracic spine. There is no pain at the end range of motion.  Special Tests: Neer's negative Hawkin's negative Amos's negative Resisted ext rotation negative Lift off negative [place] negative Speed's negative Apprehension test negative No scapular winging.

## 2024-05-27 NOTE — CONSULT LETTER
[Dear  ___] : Dear  [unfilled], [Consult Letter:] : I had the pleasure of evaluating your patient, [unfilled]. [Please see my note below.] : Please see my note below. [Consult Closing:] : Thank you very much for allowing me to participate in the care of this patient.  If you have any questions, please do not hesitate to contact me. [Sincerely,] : Sincerely, [FreeTextEntry3] : Sully Pugh MD

## 2024-05-27 NOTE — END OF VISIT
[FreeTextEntry3] : All medical record entries made by the Murielibe were at my, Dr.Leslie Pugh, direction and personally dictated by me on 05/22/2024. I have reviewed the chart and agree that the record accurately reflects my personal performance of the history, physical exam, assessment, and plan. I have also personally directed, reviewed, and agreed with the chart. [Time Spent: ___ minutes] : I have spent [unfilled] minutes of time on the encounter.

## 2024-05-27 NOTE — DISCUSSION/SUMMARY

## 2024-05-27 NOTE — HISTORY OF PRESENT ILLNESS
[de-identified] : CIPRIANO ESTEVEZ is a 39 year old male presents to follow up Last visit was 04/03/24 Pt presenting for a follow-up regarding right arm pain. Pt is a right-handed male and describes the pain as originating in the shoulder and extending down to his hand. He characterizes the pain as a "hammering" sensation occurring in random locations along his arm, rather than a radiating pain. This pain is similar to what he experienced during his last visit in March, although he feels he may not have explained it correctly at that time. The pain is most severe at night, particularly when he lies on the affected shoulder and arm, and when he is not distracted. He has been unable to attend in-person physical therapy due to scheduling conflicts but has been performing home exercises. The pain improves with distraction, such as when he is working as a , and does not interfere with his ability to lift objects or perform his job. He was previously prescribed a medication that provided temporary relief, but the pain returns once the medication wears off. He reports a sensation that may be tingling but denies any numbness.

## 2024-06-03 ENCOUNTER — APPOINTMENT (OUTPATIENT)
Dept: MRI IMAGING | Facility: HOSPITAL | Age: 39
End: 2024-06-03

## 2024-06-03 ENCOUNTER — OUTPATIENT (OUTPATIENT)
Dept: OUTPATIENT SERVICES | Facility: HOSPITAL | Age: 39
LOS: 1 days | End: 2024-06-03
Payer: COMMERCIAL

## 2024-06-03 ENCOUNTER — RESULT REVIEW (OUTPATIENT)
Age: 39
End: 2024-06-03

## 2024-06-03 PROCEDURE — 73221 MRI JOINT UPR EXTREM W/O DYE: CPT

## 2024-06-03 PROCEDURE — 73221 MRI JOINT UPR EXTREM W/O DYE: CPT | Mod: 26,RT

## 2024-06-17 ENCOUNTER — APPOINTMENT (OUTPATIENT)
Dept: ORTHOPEDIC SURGERY | Facility: CLINIC | Age: 39
End: 2024-06-17
Payer: COMMERCIAL

## 2024-06-17 DIAGNOSIS — M75.51 BURSITIS OF RIGHT SHOULDER: ICD-10-CM

## 2024-06-17 PROCEDURE — 99442: CPT

## 2024-06-19 PROBLEM — M75.51 SUBACROMIAL BURSITIS OF RIGHT SHOULDER JOINT: Status: ACTIVE | Noted: 2024-06-19

## 2024-06-19 NOTE — HISTORY OF PRESENT ILLNESS
[de-identified] : CIPRIANO ESTEVEZ is a 39 year old male presents to follow up right shoulder pain. Patient consents to telephone consult. 2-point identification verification confirmed. Last visit was 05/22/24 at which time patient had received a referral for MRI of the right shoulder.  MRI detailed below. Attending physical therapy. Feels that pain is unchanged.

## 2024-06-19 NOTE — HISTORY OF PRESENT ILLNESS
[de-identified] : CIPRIANO ESTEVEZ is a 39 year old male presents to follow up right shoulder pain. Patient consents to telephone consult. 2-point identification verification confirmed. Last visit was 05/22/24 at which time patient had received a referral for MRI of the right shoulder.  MRI detailed below. Attending physical therapy. Feels that pain is unchanged.

## 2024-06-19 NOTE — PHYSICAL EXAM
[de-identified] : MRI right shoulder (6/3/24): There is focal chronic chondral loss loss of the anterior/inferior margin of the humeral head spanning 8 mm. Severe subacromial/subdeltoid bursitis.

## 2024-06-19 NOTE — ASSESSMENT
[FreeTextEntry1] : CIPRIANO ESTEVEZ is a 39 year old male with right shoulder pain.    I discussed with the patient that their symptoms, signs, and imaging are most consistent with subacromial bursitis. We reviewed the natural history of this condition and treatment options. We agreed on the following plan:  MRI reviewed with patient today. Encouraged to continue home exercises per handout. Continue physical therapy. Discussed US guided subacromial CSI if no symptomatic improvement. Follow up in 4-6 weeks.

## 2024-06-19 NOTE — PHYSICAL EXAM
[de-identified] : MRI right shoulder (6/3/24): There is focal chronic chondral loss loss of the anterior/inferior margin of the humeral head spanning 8 mm. Severe subacromial/subdeltoid bursitis.

## 2024-08-08 ENCOUNTER — APPOINTMENT (OUTPATIENT)
Dept: ORTHOPEDIC SURGERY | Facility: CLINIC | Age: 39
End: 2024-08-08

## 2024-08-08 NOTE — HISTORY OF PRESENT ILLNESS
[de-identified] : CIPRIANO NDOJ is a 39 year old male presents to follow up Last visit was 06/17/2024

## 2024-08-08 NOTE — ASSESSMENT
[FreeTextEntry1] : CIPRIANO ESTEVEZ is a 39 year old male with     I discussed with the patient that their symptoms, signs, and imaging are most consistent with  **.  We reviewed the natural history of this condition and treatment options. We agreed on the following plan:  Encouraged to continue home exercises per handout. Continue physical therapy. Recommend 150 min per week of moderate intensity aerobic activity  Medication:    prescription provided. Imaging: Follow up in 6-8 weeks.

## 2024-08-09 ENCOUNTER — APPOINTMENT (OUTPATIENT)
Dept: ORTHOPEDIC SURGERY | Facility: CLINIC | Age: 39
End: 2024-08-09

## 2024-08-09 PROBLEM — M65.9 TIBIALIS ANTERIOR TENOSYNOVITIS: Status: ACTIVE | Noted: 2024-08-09

## 2024-08-09 PROCEDURE — 99214 OFFICE O/P EST MOD 30 MIN: CPT

## 2024-08-11 NOTE — REASON FOR VISIT
[Follow-Up Visit] : a follow-up visit for [FreeTextEntry2] : right shoulder and bilateral shin pain.

## 2024-08-11 NOTE — END OF VISIT
[FreeTextEntry3] :   Documented by Effie Boswell acting as a scribe for Dr. Sully Pugh. 08/09/2024   All medical record entries made by the Effie Boswell (Scribe) were at my, Dr. Sully Pugh, direction and personally dictated by me on 08/09/2024. I have reviewed the chart and agree that the record accurately reflects my personal performance of the history, physical exam, assessment and plan. I have also personally directed, reviewed, and agreed with the chart. [Time Spent: ___ minutes] : I have spent [unfilled] minutes of time on the encounter.

## 2024-08-11 NOTE — DISCUSSION/SUMMARY

## 2024-08-11 NOTE — PHYSICAL EXAM
[de-identified] : General: Well-nourished, well-developed, alert, and in no acute distress. Head: Normocephalic. Eyes: Pupils equal, extraocular muscles intact, normal sclera. Nose: No nasal discharge. Cardiovascular: Extremities are warm and well perfused. Distal pulses are symmetric bilaterally. Respiratory: No labored breathing. Extremities: Sensation is intact distally bilaterally. Distal pulses are symmetric bilaterally Lymphatic: No regional lymphadenopathy, no lymphedema Neurologic: No focal deficits Skin: Normal skin color, texture, and turgor Psychiatric: Normal affect  MSK: Examination of [right] lower extremity Gait [normal] Able to toe walk, heel walk Ambulating independently Inspection: No erythema, hematoma, ecchymosis or edema No warmth Alignment: neutral   Tender to palpation: tibialis anterior distal third Nontender to palpation: medial malleolus, lateral malleolus, base of 5th metatarsal, navicular, ATFL, CFL, Achilles tendon, posterior tibialis, FHL, peroneals, plantar fascia   ROM: Plantar flexion 45 deg, dorsiflexion 5 deg, inversion 20 deg, eversion 20 deg   Special Tests:   Jorje's click [negative] No Hallux valgus deformity No Valgus hindfoot deformity No Jorge's deformity Anterior Drawer (ATFL): negative for pain and laxity Talar Tilt (CFL): negative for pain and laxity Kleigers (ext rot): negative for pain and laxity at deltoid ligament or distal fibula Squeeze test: negative at proximal and distal syndesmosis Bump test: negative at talar dome, syndesmosis Tinel's at tarsal tunnel negative Macias test negative   Examination of [left] lower extremity   Inspection: No erythema, hematoma, ecchymosis or edema No warmth   Tender to palpation: tibialis anterior tendon Nontender to palpation: medial malleolus, lateral malleolus, base of 5th metatarsal, navicular, ATFL, CFL, PTFL, AITFL, Achilles tendon, PT, FHL, peroneals, plantar fascia   ROM: Plantar flexion 40 deg, dorsiflexion 10 deg, inversion 20 deg, eversion 20 deg   Special Tests:   Jorje's click negative No Hallux valgus deformity No Valgus hindfoot deformity No Jorge's deformity Anterior Drawer (ATFL): negative for pain and laxity Talar Tilt (CFL): negative for pain and laxity Kleigers (ext rot): negative for pain and laxity at deltoid ligament or distal fibula Squeeze test: negative at proximal and distal syndesmosis Bump test: negative at talar dome, syndesmosis Tinel's at tarsal tunnel negative Macias test negative   Sensation is intact to light touch over the superficial and deep peroneal nerve distributions and the posterior tibial nerve distribution. Capillary refill is less than two seconds. Posterior tibial and dorsalis pedis pulses 2+ equal bilaterally. No calf swelling or tenderness bilaterally. Strength testing shows  Hip flexion 5/5, Hip abduction 5/5, Hip abduction 5/5, Knee Extension 5/5, Knee Flexion 5/5, dorsiflexion 5/5, plantar flexion 5/5, EHL 5/5 Reflexes: Patellar 2+, Achilles 2+.

## 2024-08-11 NOTE — DISCUSSION/SUMMARY

## 2024-08-11 NOTE — ASSESSMENT
[FreeTextEntry1] : CIPRIANO ESTEVEZ is a 39 year old male with right shoulder and bilateral shin pain    I discussed with the patient that their symptoms, signs, and imaging are most consistent with subacromial bursitis and tibialis anterior tenosynovitis.  We reviewed the natural history of this condition and treatment options. We agreed on the following plan:  Encouraged to continue shoulder home exercises per handout. Start home exercises for tibialis anterior tendon conditioning period, new handout provided. Complete physical therapy for right shoulder. Upon completion can start physical therapy for bilateral shin pain. New referral provided. Recommend 150 min per week of moderate intensity aerobic activity  Medication: diclofenac gel PRN. Prescription provided. Imaging: X-ray tib-fib, to be performed at a later date as patient did not have time today Follow up in 6-8 weeks.

## 2024-08-11 NOTE — PHYSICAL EXAM
[de-identified] : General: Well-nourished, well-developed, alert, and in no acute distress. Head: Normocephalic. Eyes: Pupils equal, extraocular muscles intact, normal sclera. Nose: No nasal discharge. Cardiovascular: Extremities are warm and well perfused. Distal pulses are symmetric bilaterally. Respiratory: No labored breathing. Extremities: Sensation is intact distally bilaterally. Distal pulses are symmetric bilaterally Lymphatic: No regional lymphadenopathy, no lymphedema Neurologic: No focal deficits Skin: Normal skin color, texture, and turgor Psychiatric: Normal affect  MSK: Examination of [right] lower extremity Gait [normal] Able to toe walk, heel walk Ambulating independently Inspection: No erythema, hematoma, ecchymosis or edema No warmth Alignment: neutral   Tender to palpation: tibialis anterior distal third Nontender to palpation: medial malleolus, lateral malleolus, base of 5th metatarsal, navicular, ATFL, CFL, Achilles tendon, posterior tibialis, FHL, peroneals, plantar fascia   ROM: Plantar flexion 45 deg, dorsiflexion 5 deg, inversion 20 deg, eversion 20 deg   Special Tests:   Jorje's click [negative] No Hallux valgus deformity No Valgus hindfoot deformity No Jorge's deformity Anterior Drawer (ATFL): negative for pain and laxity Talar Tilt (CFL): negative for pain and laxity Kleigers (ext rot): negative for pain and laxity at deltoid ligament or distal fibula Squeeze test: negative at proximal and distal syndesmosis Bump test: negative at talar dome, syndesmosis Tinel's at tarsal tunnel negative Macias test negative   Examination of [left] lower extremity   Inspection: No erythema, hematoma, ecchymosis or edema No warmth   Tender to palpation: tibialis anterior tendon Nontender to palpation: medial malleolus, lateral malleolus, base of 5th metatarsal, navicular, ATFL, CFL, PTFL, AITFL, Achilles tendon, PT, FHL, peroneals, plantar fascia   ROM: Plantar flexion 40 deg, dorsiflexion 10 deg, inversion 20 deg, eversion 20 deg   Special Tests:   Jorje's click negative No Hallux valgus deformity No Valgus hindfoot deformity No Jorge's deformity Anterior Drawer (ATFL): negative for pain and laxity Talar Tilt (CFL): negative for pain and laxity Kleigers (ext rot): negative for pain and laxity at deltoid ligament or distal fibula Squeeze test: negative at proximal and distal syndesmosis Bump test: negative at talar dome, syndesmosis Tinel's at tarsal tunnel negative Macias test negative   Sensation is intact to light touch over the superficial and deep peroneal nerve distributions and the posterior tibial nerve distribution. Capillary refill is less than two seconds. Posterior tibial and dorsalis pedis pulses 2+ equal bilaterally. No calf swelling or tenderness bilaterally. Strength testing shows  Hip flexion 5/5, Hip abduction 5/5, Hip abduction 5/5, Knee Extension 5/5, Knee Flexion 5/5, dorsiflexion 5/5, plantar flexion 5/5, EHL 5/5 Reflexes: Patellar 2+, Achilles 2+.

## 2024-08-11 NOTE — HISTORY OF PRESENT ILLNESS
[de-identified] : CIPRIANO ESTEVEZ is a 39-year-old male presents to follow up for right shoulder pain. Pt. is attending PT, states its helping. As per the shoulder is much better. Pt. is also experiencing around pain around the calf area. On the right he has 2-3 screws from an accident he had few years back.  Pt reports progress with shoulder, is compliant with going to PT and home exercise. Reports is experiencing bilateral anterior distal shin pain at work with heavy lifting, is unsure how long the problem has been going on but reports it recently became "a problem." Denies numbness/tingling.  Last visit was 07/11/2024.

## 2024-08-11 NOTE — HISTORY OF PRESENT ILLNESS
[de-identified] : CIPRIANO ESTEVEZ is a 39-year-old male presents to follow up for right shoulder pain. Pt. is attending PT, states its helping. As per the shoulder is much better. Pt. is also experiencing around pain around the calf area. On the right he has 2-3 screws from an accident he had few years back.  Pt reports progress with shoulder, is compliant with going to PT and home exercise. Reports is experiencing bilateral anterior distal shin pain at work with heavy lifting, is unsure how long the problem has been going on but reports it recently became "a problem." Denies numbness/tingling.  Last visit was 07/11/2024.

## 2024-08-12 ENCOUNTER — NON-APPOINTMENT (OUTPATIENT)
Age: 39
End: 2024-08-12

## 2024-08-12 ENCOUNTER — APPOINTMENT (OUTPATIENT)
Dept: OPHTHALMOLOGY | Facility: CLINIC | Age: 39
End: 2024-08-12
Payer: COMMERCIAL

## 2024-08-12 ENCOUNTER — RESULT REVIEW (OUTPATIENT)
Age: 39
End: 2024-08-12

## 2024-08-12 PROCEDURE — 92012 INTRM OPH EXAM EST PATIENT: CPT

## 2024-08-12 PROCEDURE — 92250 FUNDUS PHOTOGRAPHY W/I&R: CPT

## 2024-09-26 ENCOUNTER — APPOINTMENT (OUTPATIENT)
Dept: OPHTHALMOLOGY | Facility: CLINIC | Age: 39
End: 2024-09-26

## 2024-10-10 ENCOUNTER — APPOINTMENT (OUTPATIENT)
Dept: ORTHOPEDIC SURGERY | Facility: CLINIC | Age: 39
End: 2024-10-10

## 2024-10-11 DIAGNOSIS — M75.51 BURSITIS OF RIGHT SHOULDER: ICD-10-CM

## 2024-10-11 DIAGNOSIS — G25.89 OTHER SPECIFIED EXTRAPYRAMIDAL AND MOVEMENT DISORDERS: ICD-10-CM

## 2024-10-11 DIAGNOSIS — M65.969 UNSP SYNOVITIS AND TENOSYNOVITIS, UNSPECIFIED LOWER LEG: ICD-10-CM

## 2024-11-06 ENCOUNTER — APPOINTMENT (OUTPATIENT)
Dept: ORTHOPEDIC SURGERY | Facility: CLINIC | Age: 39
End: 2024-11-06

## 2025-06-17 NOTE — ED ADULT NURSE NOTE - CAS EDN DISCHARGE ASSESSMENT
Tell him his labs to much to my surprise are remarkably normal he is not diabetic his cholesterol is okay he has 1 thyroid test that is slightly low for which we should put him on 0.5 levothyroxine with a repeat test of the thyroid in 3 months but tell him it is only very borderline and would certainly not explain this weight ask him if he intends to go on to the bariatric orientation    Spoke with patient    Alert and oriented to person, place and time

## 2025-08-21 ENCOUNTER — NON-APPOINTMENT (OUTPATIENT)
Age: 40
End: 2025-08-21

## 2025-08-21 ENCOUNTER — APPOINTMENT (OUTPATIENT)
Dept: OPHTHALMOLOGY | Facility: CLINIC | Age: 40
End: 2025-08-21
Payer: COMMERCIAL

## 2025-08-21 PROCEDURE — 92012 INTRM OPH EXAM EST PATIENT: CPT

## 2025-08-29 ENCOUNTER — EMERGENCY (EMERGENCY)
Facility: HOSPITAL | Age: 40
LOS: 1 days | End: 2025-08-29
Attending: STUDENT IN AN ORGANIZED HEALTH CARE EDUCATION/TRAINING PROGRAM | Admitting: STUDENT IN AN ORGANIZED HEALTH CARE EDUCATION/TRAINING PROGRAM
Payer: COMMERCIAL

## 2025-08-29 VITALS
OXYGEN SATURATION: 97 % | DIASTOLIC BLOOD PRESSURE: 79 MMHG | RESPIRATION RATE: 16 BRPM | SYSTOLIC BLOOD PRESSURE: 122 MMHG | TEMPERATURE: 98 F | HEART RATE: 77 BPM

## 2025-08-29 VITALS
TEMPERATURE: 97 F | SYSTOLIC BLOOD PRESSURE: 136 MMHG | HEART RATE: 90 BPM | RESPIRATION RATE: 18 BRPM | OXYGEN SATURATION: 97 % | WEIGHT: 179.9 LBS | DIASTOLIC BLOOD PRESSURE: 83 MMHG

## 2025-08-29 LAB
ALBUMIN SERPL ELPH-MCNC: 4.3 G/DL — SIGNIFICANT CHANGE UP (ref 3.3–5)
ALP SERPL-CCNC: 71 U/L — SIGNIFICANT CHANGE UP (ref 40–120)
ALT FLD-CCNC: 29 U/L — SIGNIFICANT CHANGE UP (ref 10–45)
ANION GAP SERPL CALC-SCNC: 10 MMOL/L — SIGNIFICANT CHANGE UP (ref 5–17)
AST SERPL-CCNC: SIGNIFICANT CHANGE UP (ref 10–40)
BASOPHILS # BLD AUTO: 0.01 K/UL — SIGNIFICANT CHANGE UP (ref 0–0.2)
BASOPHILS NFR BLD AUTO: 0.2 % — SIGNIFICANT CHANGE UP (ref 0–2)
BILIRUB DIRECT SERPL-MCNC: 0.1 MG/DL — SIGNIFICANT CHANGE UP (ref 0–0.3)
BILIRUB INDIRECT FLD-MCNC: 0.2 MG/DL — SIGNIFICANT CHANGE UP (ref 0.2–1)
BILIRUB SERPL-MCNC: 0.3 MG/DL — SIGNIFICANT CHANGE UP (ref 0.2–1.2)
BUN SERPL-MCNC: 17 MG/DL — SIGNIFICANT CHANGE UP (ref 7–23)
CALCIUM SERPL-MCNC: 9.4 MG/DL — SIGNIFICANT CHANGE UP (ref 8.4–10.5)
CHLORIDE SERPL-SCNC: 106 MMOL/L — SIGNIFICANT CHANGE UP (ref 96–108)
CO2 SERPL-SCNC: 25 MMOL/L — SIGNIFICANT CHANGE UP (ref 22–31)
CREAT SERPL-MCNC: 0.93 MG/DL — SIGNIFICANT CHANGE UP (ref 0.5–1.3)
EGFR: 106 ML/MIN/1.73M2 — SIGNIFICANT CHANGE UP
EGFR: 106 ML/MIN/1.73M2 — SIGNIFICANT CHANGE UP
EOSINOPHIL # BLD AUTO: 0.2 K/UL — SIGNIFICANT CHANGE UP (ref 0–0.5)
EOSINOPHIL NFR BLD AUTO: 3.3 % — SIGNIFICANT CHANGE UP (ref 0–6)
GLUCOSE SERPL-MCNC: 91 MG/DL — SIGNIFICANT CHANGE UP (ref 70–99)
HCT VFR BLD CALC: 40.8 % — SIGNIFICANT CHANGE UP (ref 39–50)
HGB BLD-MCNC: 14.4 G/DL — SIGNIFICANT CHANGE UP (ref 13–17)
IMM GRANULOCYTES # BLD AUTO: 0.01 K/UL — SIGNIFICANT CHANGE UP (ref 0–0.07)
IMM GRANULOCYTES NFR BLD AUTO: 0.2 % — SIGNIFICANT CHANGE UP (ref 0–0.9)
LIDOCAIN IGE QN: 29 U/L — SIGNIFICANT CHANGE UP (ref 7–60)
LYMPHOCYTES # BLD AUTO: 2.13 K/UL — SIGNIFICANT CHANGE UP (ref 1–3.3)
LYMPHOCYTES NFR BLD AUTO: 34.7 % — SIGNIFICANT CHANGE UP (ref 13–44)
MCHC RBC-ENTMCNC: 31.2 PG — SIGNIFICANT CHANGE UP (ref 27–34)
MCHC RBC-ENTMCNC: 35.3 G/DL — SIGNIFICANT CHANGE UP (ref 32–36)
MCV RBC AUTO: 88.3 FL — SIGNIFICANT CHANGE UP (ref 80–100)
MONOCYTES # BLD AUTO: 0.4 K/UL — SIGNIFICANT CHANGE UP (ref 0–0.9)
MONOCYTES NFR BLD AUTO: 6.5 % — SIGNIFICANT CHANGE UP (ref 2–14)
NEUTROPHILS # BLD AUTO: 3.38 K/UL — SIGNIFICANT CHANGE UP (ref 1.8–7.4)
NEUTROPHILS NFR BLD AUTO: 55.1 % — SIGNIFICANT CHANGE UP (ref 43–77)
NRBC # BLD AUTO: 0 K/UL — SIGNIFICANT CHANGE UP (ref 0–0)
NRBC # FLD: 0 K/UL — SIGNIFICANT CHANGE UP (ref 0–0)
NRBC BLD AUTO-RTO: 0 /100 WBCS — SIGNIFICANT CHANGE UP (ref 0–0)
PLATELET # BLD AUTO: 191 K/UL — SIGNIFICANT CHANGE UP (ref 150–400)
PMV BLD: 9.9 FL — SIGNIFICANT CHANGE UP (ref 7–13)
POTASSIUM SERPL-MCNC: 4.3 MMOL/L — SIGNIFICANT CHANGE UP (ref 3.5–5.3)
POTASSIUM SERPL-SCNC: 4.3 MMOL/L — SIGNIFICANT CHANGE UP (ref 3.5–5.3)
PROT SERPL-MCNC: 7.1 G/DL — SIGNIFICANT CHANGE UP (ref 6–8.3)
RBC # BLD: 4.62 M/UL — SIGNIFICANT CHANGE UP (ref 4.2–5.8)
RBC # FLD: 11.9 % — SIGNIFICANT CHANGE UP (ref 10.3–14.5)
SODIUM SERPL-SCNC: 141 MMOL/L — SIGNIFICANT CHANGE UP (ref 135–145)
TROPONIN T, HIGH SENSITIVITY RESULT: <6 NG/L — SIGNIFICANT CHANGE UP
WBC # BLD: 6.13 K/UL — SIGNIFICANT CHANGE UP (ref 3.8–10.5)
WBC # FLD AUTO: 6.13 K/UL — SIGNIFICANT CHANGE UP (ref 3.8–10.5)

## 2025-08-29 PROCEDURE — 85025 COMPLETE CBC W/AUTO DIFF WBC: CPT

## 2025-08-29 PROCEDURE — 84484 ASSAY OF TROPONIN QUANT: CPT

## 2025-08-29 PROCEDURE — 80048 BASIC METABOLIC PNL TOTAL CA: CPT

## 2025-08-29 PROCEDURE — 71045 X-RAY EXAM CHEST 1 VIEW: CPT

## 2025-08-29 PROCEDURE — 36415 COLL VENOUS BLD VENIPUNCTURE: CPT

## 2025-08-29 PROCEDURE — 99283 EMERGENCY DEPT VISIT LOW MDM: CPT | Mod: 25

## 2025-08-29 PROCEDURE — 71045 X-RAY EXAM CHEST 1 VIEW: CPT | Mod: 26

## 2025-08-29 PROCEDURE — 83690 ASSAY OF LIPASE: CPT

## 2025-08-29 PROCEDURE — 99285 EMERGENCY DEPT VISIT HI MDM: CPT

## 2025-08-29 PROCEDURE — 80076 HEPATIC FUNCTION PANEL: CPT

## 2025-08-29 RX ORDER — MAGNESIUM, ALUMINUM HYDROXIDE 200-200 MG
30 TABLET,CHEWABLE ORAL ONCE
Refills: 0 | Status: COMPLETED | OUTPATIENT
Start: 2025-08-29 | End: 2025-08-29

## 2025-08-29 RX ADMIN — Medication 40 MILLIGRAM(S): at 17:09

## 2025-08-29 RX ADMIN — Medication 30 MILLILITER(S): at 17:09

## 2025-09-01 DIAGNOSIS — R07.9 CHEST PAIN, UNSPECIFIED: ICD-10-CM

## 2025-09-01 DIAGNOSIS — R07.2 PRECORDIAL PAIN: ICD-10-CM

## 2025-09-18 ENCOUNTER — APPOINTMENT (OUTPATIENT)
Dept: GASTROENTEROLOGY | Facility: CLINIC | Age: 40
End: 2025-09-18
Payer: COMMERCIAL

## 2025-09-18 VITALS
WEIGHT: 185 LBS | OXYGEN SATURATION: 97 % | DIASTOLIC BLOOD PRESSURE: 92 MMHG | BODY MASS INDEX: 27.4 KG/M2 | TEMPERATURE: 97.3 F | SYSTOLIC BLOOD PRESSURE: 123 MMHG | HEART RATE: 110 BPM | RESPIRATION RATE: 16 BRPM | HEIGHT: 69 IN

## 2025-09-18 DIAGNOSIS — R12 HEARTBURN: ICD-10-CM

## 2025-09-18 DIAGNOSIS — R07.89 OTHER CHEST PAIN: ICD-10-CM

## 2025-09-18 PROCEDURE — 99204 OFFICE O/P NEW MOD 45 MIN: CPT

## 2025-09-18 RX ORDER — OMEPRAZOLE 40 MG/1
40 CAPSULE, DELAYED RELEASE ORAL
Qty: 60 | Refills: 3 | Status: ACTIVE | COMMUNITY
Start: 2025-09-18 | End: 1900-01-01